# Patient Record
(demographics unavailable — no encounter records)

---

## 2025-06-10 NOTE — PHYSICAL EXAM
[General Appearance - Well Developed] : well developed [General Appearance - Well Nourished] : well nourished [Heart Rate And Rhythm] : heart rate and rhythm were normal [] : no respiratory distress [Abdomen Soft] : soft [Abdomen Tenderness] : non-tender [Abdomen Mass (___ Cm)] : no abdominal mass palpated [Urethral Meatus] : the meatus of the urethra showed no abnormalities [External Female Genitalia] : normal external genitalia [Vagina] : normal vaginal exam [Normal Station and Gait] : the gait and station were normal for the patient's age [Skin Turgor] : supple [No Focal Deficits] : no focal deficits [Oriented To Time, Place, And Person] : oriented to person, place, and time [Not Anxious] : not anxious [de-identified] : Vaginal tissue appears well estrogenized.  Q-tip sensitivity at 3 6 and 9:00.  Vaginal examination reveals no pain at the neck of the bladder no urethral hypermobility bilateral muscle tenderness left greater than right.  Majority of the disc elicited vaginal

## 2025-06-10 NOTE — ASSESSMENT
[FreeTextEntry1] : Ms. 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  The patient continues to experience symptoms of dyspareunia and hypertonicity of pelvic floor.  The patient avoids use of tampons during menses and complains of predominant pain with penetration.  Urologically patient denies lower urinary tract symptoms, recent urinary tract or sexually transmitted infections.  The patient further denies hematuria.  Last menstrual period May 28, 2025 patient's medical and surgical history otherwise documented in the chart and medications are reconciled.  [place] and [place] had a long discussion about trial of muscle relaxant that will be available outside of [place].  Prescription for baclofen and Valium 2 mg was given to the patient since [place] is leaving the country on 6/13/25.  Hopefully the patient will be able to  care with her provider when she establishes her care [place] for treatment of vulvodynia amitriptyline baclofen 2/2% in [facility]  I will inquire about physical therapy for this patient.  Routine blood work was sent for CBC CMP lipid profile as well as ferritin iron since the patient is vegan for past 13 years and has not yet established primary care in the new [place].  I also ordered a chest x-ray per patient's request as Meenakshi is suffering with persistent cough to rule out any other pathology.  The patient denies fever chills.  The patient understands if any abnormal pathology is found on chest she will have to follow-up with internal medicine or pulmonology.

## 2025-06-10 NOTE — PHYSICAL EXAM
[General Appearance - Well Developed] : well developed [General Appearance - Well Nourished] : well nourished [Heart Rate And Rhythm] : heart rate and rhythm were normal [] : no respiratory distress [Abdomen Soft] : soft [Abdomen Tenderness] : non-tender [Abdomen Mass (___ Cm)] : no abdominal mass palpated [Urethral Meatus] : the meatus of the urethra showed no abnormalities [External Female Genitalia] : normal external genitalia [Vagina] : normal vaginal exam [Normal Station and Gait] : the gait and station were normal for the patient's age [Skin Turgor] : supple [No Focal Deficits] : no focal deficits [Oriented To Time, Place, And Person] : oriented to person, place, and time [Not Anxious] : not anxious [de-identified] : Vaginal tissue appears well estrogenized.  Q-tip sensitivity at 3 6 and 9:00.  Vaginal examination reveals no pain at the neck of the bladder no urethral hypermobility bilateral muscle tenderness left greater than right.  Majority of the disc elicited vaginal

## 2025-06-10 NOTE — ASSESSMENT
[FreeTextEntry1] : Ms. 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  The patient continues to experience symptoms of dyspareunia and hypertonicity of pelvic floor.  The patient avoids use of tampons during menses and complains of predominant pain with penetration.  Urologically patient denies lower urinary tract symptoms, recent urinary tract or sexually transmitted infections.  The patient further denies hematuria.  Last menstrual period May 28, 2025 patient's medical and surgical history otherwise documented in the chart and medications are reconciled.  [place] and we had a long discussion about trial of muscle relaxant that will be available outside of [place].  Prescription for baclofen and Valium 2 mg was given to the patient since [place] is leaving the country on 6/13/25.  Hopefully the patient will be able to  care with her provider when she establishes her care abroad for treatment of vulvodynia amitriptyline baclofen 2/2% in [facility]  I will inquire about physical therapy for this patient.  Routine blood work was sent for CBC CMP lipid profile as well as ferritin iron since the patient is vegan for past 13 years and has not yet established primary care in the new country.  I also ordered a chest x-ray per patient's request as Meenakshi is suffering with persistent cough to rule out any other pathology.  The patient denies fever chills.  The patient understands if any abnormal pathology is found on chest she will have to follow-up with internal medicine or pulmonology.